# Patient Record
Sex: FEMALE | Race: BLACK OR AFRICAN AMERICAN
[De-identification: names, ages, dates, MRNs, and addresses within clinical notes are randomized per-mention and may not be internally consistent; named-entity substitution may affect disease eponyms.]

---

## 2019-06-07 ENCOUNTER — HOSPITAL ENCOUNTER (EMERGENCY)
Dept: HOSPITAL 26 - MED | Age: 44
Discharge: HOME | End: 2019-06-07
Payer: SELF-PAY

## 2019-06-07 VITALS — SYSTOLIC BLOOD PRESSURE: 144 MMHG | DIASTOLIC BLOOD PRESSURE: 99 MMHG

## 2019-06-07 VITALS — WEIGHT: 170.13 LBS | BODY MASS INDEX: 28.35 KG/M2 | HEIGHT: 65 IN

## 2019-06-07 VITALS — SYSTOLIC BLOOD PRESSURE: 153 MMHG | DIASTOLIC BLOOD PRESSURE: 105 MMHG

## 2019-06-07 DIAGNOSIS — Z88.8: ICD-10-CM
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
## 2019-06-07 DIAGNOSIS — S05.01XA: Primary | ICD-10-CM

## 2019-06-07 DIAGNOSIS — I10: ICD-10-CM
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
## 2019-06-07 DIAGNOSIS — Z90.5: ICD-10-CM
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
## 2019-06-07 DIAGNOSIS — Z90.710: ICD-10-CM
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
## 2019-06-07 NOTE — NUR
Patient discharged with v/s stable. Written and verbal after care instructions 
given and explained. 

Patient alert, oriented and verbalized understanding of instructions. 
Ambulatory with steady gait. All questions addressed prior to discharge. ID 
band removed. Patient advised to follow up with PMD. Rx of VIGAMOX, NORCO 
given. Patient educated on indication of medication including possible reaction 
and side effects. Opportunity to ask questions provided and answered.

## 2019-06-07 NOTE — NUR
C/O L EYE PAIN X3 HOURS. PT REPORTS THAT SHE WAS CRYING AT A  ALL DAY 
AND THEN HER EYE STARTED BURNING SO SHE REMOVED HER CONTACTS AND HER L EYE 
CONTINUED TO BURN AND WATER. PT DENIES TRAUMA OR GETTING ANYTHING IN EYE. 
CONSTANT SHARP PAIN AT 10/10. PT TX WITH VISINE. EYE IS WATERING & RED, PT 
HAVING A HARD TIME HOLDING EYE OPEN, DENIES VISION CHANGES. BED IN LOW POSTION, 
SIDE RAIL UP X1. FRIEND AT BEDSIDE.